# Patient Record
Sex: MALE | Race: WHITE | NOT HISPANIC OR LATINO | URBAN - METROPOLITAN AREA
[De-identification: names, ages, dates, MRNs, and addresses within clinical notes are randomized per-mention and may not be internally consistent; named-entity substitution may affect disease eponyms.]

---

## 2022-05-12 ENCOUNTER — LAB OUTSIDE AN ENCOUNTER (OUTPATIENT)
Dept: URBAN - METROPOLITAN AREA CLINIC 70 | Facility: CLINIC | Age: 62
End: 2022-05-12

## 2022-05-12 ENCOUNTER — DASHBOARD ENCOUNTERS (OUTPATIENT)
Age: 62
End: 2022-05-12

## 2022-05-12 ENCOUNTER — OFFICE VISIT (OUTPATIENT)
Dept: URBAN - METROPOLITAN AREA CLINIC 70 | Facility: CLINIC | Age: 62
End: 2022-05-12
Payer: COMMERCIAL

## 2022-05-12 VITALS
DIASTOLIC BLOOD PRESSURE: 85 MMHG | SYSTOLIC BLOOD PRESSURE: 144 MMHG | HEIGHT: 72 IN | HEART RATE: 86 BPM | WEIGHT: 221.9 LBS | TEMPERATURE: 97.1 F | BODY MASS INDEX: 30.05 KG/M2

## 2022-05-12 DIAGNOSIS — R10.12 LUQ ABDOMINAL PAIN: ICD-10-CM

## 2022-05-12 DIAGNOSIS — K70.30 ALCOHOLIC CIRRHOSIS OF LIVER WITHOUT ASCITES: ICD-10-CM

## 2022-05-12 PROCEDURE — 99204 OFFICE O/P NEW MOD 45 MIN: CPT

## 2022-05-12 RX ORDER — TRAZODONE HYDROCHLORIDE 50 MG/1
TABLET ORAL
Qty: 90 | Status: ON HOLD | COMMUNITY

## 2022-05-12 RX ORDER — PANTOPRAZOLE SODIUM 40 MG/1
TAKE 1 TABLET IN THE MORNING ON AN EMPTY STOMACH, WAIT 30 MINUTES, THEN START EATING TABLET, DELAYED RELEASE ORAL ONCE A DAY
Qty: 90 | Refills: 3 | OUTPATIENT
Start: 2022-05-12

## 2022-05-12 RX ORDER — GLIMEPIRIDE 4 MG/1
TABLET ORAL
Qty: 90 | Status: ACTIVE | COMMUNITY

## 2022-05-12 RX ORDER — OLMESARTAN MEDOXOMIL 20 MG/1
TABLET, FILM COATED ORAL
Qty: 90 | Status: ACTIVE | COMMUNITY

## 2022-05-12 NOTE — PHYSICAL EXAM GASTROINTESTINAL
Abdomen , soft, mild LUQ TTP nondistended , no guarding or rigidity , no masses palpable , normal bowel sounds , Liver and Spleen , no hepatosplenomegaly , liver nontender

## 2022-05-12 NOTE — HPI-TODAY'S VISIT:
Pt presents for cirrhosis.  He was seen in the ED 3/18/2022 for chest pain.  CT at the time showed cirrhosis.  Labs showed alk phos 105 , ALT 29, AST 35, and Bili 0.8.  US 4/21/2022 showed a cirrhotic liver. Chest pain work-up was negative.  Today he states that he has a known hx of alcoholic cirrhosis and was previously being followed by [facility] with considerations for transplant, but was discharged due to continued alcohol consumption.  Today he admits to continued alcohol consumption and states he drinks 2-4 packs of beer a week.  He states that he was previously drinking 2-4 packs of beer a day in addition to liquor. He admits to a previous EGD many years ago for surveillance of esophageal varices.  He admits to LUQ abdominal pain that occurs with changes in position.  He is not able to correlated symptoms with meals.  He has not tried anything forhis symptoms.  He denies nausea, vomiting, constipation, diarrhea, unintentional weight loss, indigestion, associated regurgitation, or melena.

## 2022-06-30 PROBLEM — 420054005: Status: ACTIVE | Noted: 2022-05-12

## 2022-08-17 ENCOUNTER — OFFICE VISIT (OUTPATIENT)
Dept: URBAN - METROPOLITAN AREA MEDICAL CENTER 42 | Facility: MEDICAL CENTER | Age: 62
End: 2022-08-17